# Patient Record
Sex: MALE | Race: WHITE | NOT HISPANIC OR LATINO | Employment: FULL TIME | URBAN - METROPOLITAN AREA
[De-identification: names, ages, dates, MRNs, and addresses within clinical notes are randomized per-mention and may not be internally consistent; named-entity substitution may affect disease eponyms.]

---

## 2019-01-11 ENCOUNTER — ANCILLARY PROCEDURE (OUTPATIENT)
Dept: GENERAL RADIOLOGY | Facility: CLINIC | Age: 49
End: 2019-01-11
Payer: COMMERCIAL

## 2019-01-11 ENCOUNTER — OFFICE VISIT (OUTPATIENT)
Dept: ORTHOPEDICS | Facility: CLINIC | Age: 49
End: 2019-01-11
Payer: COMMERCIAL

## 2019-01-11 VITALS
BODY MASS INDEX: 24.48 KG/M2 | DIASTOLIC BLOOD PRESSURE: 87 MMHG | WEIGHT: 201 LBS | HEIGHT: 76 IN | SYSTOLIC BLOOD PRESSURE: 132 MMHG

## 2019-01-11 DIAGNOSIS — M25.532 LEFT WRIST PAIN: Primary | ICD-10-CM

## 2019-01-11 DIAGNOSIS — M25.532 LEFT WRIST PAIN: ICD-10-CM

## 2019-01-11 ASSESSMENT — MIFFLIN-ST. JEOR: SCORE: 1883.23

## 2019-01-11 NOTE — PROGRESS NOTES
SPORTS & ORTHOPEDIC WALK-IN VISIT 1/11/2019    Primary Care Physician:      Left wrist pain. He fell (FOOSH) skating on [place] day. He had some swelling and pain in his wrist initial. Pain with supination and pronation and bearing weight.     Reason for visit:     What part of your body is injured / painful?  left wrist    What caused the injury /pain? Fall    How long ago did your injury occur or pain begin? 12/25    What are your most bothersome symptoms? Pain    How would you characterize your symptom?  aching    What makes your symptoms better? Rest and Ice    What makes your symptoms worse? Movement and Other: weight bearing     Have you been previously seen for this problem? No    Medical History:    Any recent changes to your medical history? No    Any new medication prescribed since last visit? No    Have you had surgery on this body part before? No    Social History:    Occupation:     Handedness: Right    Exercise: 3-4 days/week    Review of Systems:    Do you have fever, chills, weight loss? No    Do you have any vision problems? No    Do you have any chest pain or edema? No    Do you have any shortness of breath or wheezing?  No    Do you have stomach problems? No    Do you have any numbness or focal weakness? No    Do you have diabetes? No    Do you have problems with bleeding or clotting? No    Do you have an rashes or other skin lesions? No

## 2019-01-11 NOTE — PATIENT INSTRUCTIONS
Extensor Carpi Ulnaris Tendonitis. (ECU)    1. Rest from offending activities  2. Wrist brace without thumb spica  3. Aleve twice daily x 5-7 days  4. Ice to wrist  5. If persisting in 4-6 weeks, return to walk-in clinic    Wrist Stretching/Strengthening Exercises    STRETCHING EXERCISES    Wrist range of motion  Flexion: Gently bend your wrist forward. Hold for 5 seconds. Do 2 sets of 15.  Extension: Gently bend your wrist backward. Hold this position 5 seconds. Do 2 sets of 15.    Side to side: Gently move your wrist from side to side (a handshake motion). Hold for 5 seconds in each direction. Do 2 sets of 15.  Wrist stretch: Press the back of the hand on your injured side with your other hand to help bend your wrist. Hold for 15 to 30 seconds. Next, stretch the hand back by pressing the fingers in a backward direction. Hold for 15 to 30 seconds. Keep the arm on your injured side straight during this exercise. Do 3 sets.    Wrist extension stretch: Stand at a table with your palms down, fingers flat, and elbows straight. Lean your body weight forward. Hold this position for 15 seconds. Repeat 3 times.    Wrist flexion stretch: Stand with the back of your hands on a table, palms facing up, fingers pointing toward your body, and elbows straight. Lean away from the table. Hold this position for 15 to 30 seconds. Repeat 3 times.    Forearm pronation and supination: Bend the elbow of your injured arm 90 degrees, keeping your elbow at your side. Turn your palm up and hold for 5 seconds. Then slowly turn your palm down and hold for 5 seconds. Make sure you keep your elbow at your side and bent 90 degrees while you do the exercise. Do 2 sets of 15.    When this exercise becomes pain free, do it with some weight in your hand such as a soup can or hammer handle.    STRENGTHENING EXERCISES    Wrist flexion: Hold a can or hammer handle in your hand with your palm facing up. Bend your wrist upward. Slowly lower the weight and  return to the starting position. Do 2 sets of 15. Gradually increase the weight of the can or weight you are holding.    Wrist extension: Hold a soup can or hammer handle in your hand with your palm facing down. Slowly bend your wrist up. Slowly lower the weight down into the starting position. Do 2 sets of 15. Gradually increase the weight of the object you are holding.     strengthening: Squeeze a soft rubber ball and hold the squeeze for 5 seconds. Do 2 sets of 15.    Developed by [employer].  Published by [employer].  Copyright  2014 [employer] and/or one of its subsidiaries. All rights reserved.

## 2019-01-11 NOTE — LETTER
1/11/2019       RE: Gregory Freitas  [address]  [place] [address]     Dear Colleague,    Thank you for referring your patient, Gregory Freitas, to the [facility] SPORTS AND ORTHOPAEDIC WALK IN CLINIC at [facility]. Please see a copy of my visit note below.          SPORTS & ORTHOPEDIC WALK-IN VISIT 1/11/2019    Primary Care Physician:      Left wrist pain. He fell (FOOSH) skating on angel[place] day. He had some swelling and pain in his wrist initial. Pain with supination and pronation and bearing weight.     Reason for visit:     What part of your body is injured / painful?  left wrist    What caused the injury /pain? Fall    How long ago did your injury occur or pain begin? 12/25    What are your most bothersome symptoms? Pain    How would you characterize your symptom?  aching    What makes your symptoms better? Rest and Ice    What makes your symptoms worse? Movement and Other: weight bearing     Have you been previously seen for this problem? No    Medical History:    Any recent changes to your medical history? No    Any new medication prescribed since last visit? No    Have you had surgery on this body part before? No    Social History:    Occupation:     Handedness: Right    Exercise: 3-4 days/week       CHIEF COMPLAINT:  Pain of the Left Wrist       HISTORY OF PRESENT ILLNESS  Mr. Freitas is a pleasant 48 year old year old male who presents to clinic today with pain of left wrist.  Gregory explains that he had a fall on an outstretched hand on [place] Day while ice skating.  Dorsal wrist swelling after this.  Has not worn brace and has improved overall, just having residual pain with loading wrist in extension.     Onset: sudden  Location: left wrist  Quality:  aching  Duration: 2.5 weeks   Severity: Mild  Timing:intermittent episodes   Modifying factors:  resting and non-use makes it better, movement and use makes it worse  Associated  "signs & symptoms: swelling, since resolved  Previous similar pain: No  Treatments to date: Ice, rest    Additional history: as documented    MEDICAL HISTORY  There is no problem list on file for this patient.      Current Outpatient Medications   Medication Sig Dispense Refill     azithromycin (ZITHROMAX) 250 MG tablet Two tablets first day, then one tablet daily for four days. (Patient not taking: Reported on 1/11/2019) 6 tablet 0     Naproxen Sodium (ALEVE PO)        Pseudoeph-Doxylamine-DM-APAP (NYQUIL PO)          No Known Allergies    Family History   Problem Relation Age of Onset     Hypertension Father        Additional medical/Social/Surgical histories reviewed in [facility] and updated as appropriate.     PHYSICAL EXAM  /87   Ht 1.93 m (6' 4\")   Wt 91.2 kg (201 lb)   BMI 24.47 kg/m       General  - normal appearance, in no obvious distress  HEENT  -Pupils equal, round, no conjunctival injection.  No lid lag  CV  - normal radial pulse  Pulm  - normal respiratory pattern, non-labored  Musculoskeletal - Left wrist  - inspection: normal joint alignment, no obvious deformity, no swelling or ecchymosis  - palpation: no bony tenderness. TTP of distal aspect of ECU tendon from ulnar styloid to insertion at base of fifth MT.  - ROM:  90 deg flexion   60 deg extension   25 deg abduction   65 deg adduction  - strength: 5/5  strength, 5/5 flexion, extension, pronation, supination, adduction, abduction  - special tests:  (-) Finkelstein  (-) Phalen  (-) Cr click test  (-) ulnar impaction  (-) foveal tenderness  Neuro  - no sensory or motor deficit, grossly normal coordination, normal muscle tone  Skin  - no ecchymosis, erythema, warmth, or induration, no obvious rash  Psych  - interactive, appropriate, normal mood and affect    IMAGING : XR left wrist Final results and radiologist's interpretation, available in the [facility] health record. Images were reviewed with the patient/family members in the office today. " My personal interpretation of the performed imaging is no acute osseous abnormalities of left wrist.     ASSESSMENT & PLAN  Mr. Freitas is a 48 year old year old male who presents to clinic today with acute left wrist pain residual from injury 2.5 weeks ago.  He continues to feel ulnar sided wrist pain with loading wrist in extension.  Suspected ECU tendonitis.     Diagnosis: Pain of left wrist, acute    -Wrist brace recommended volar  -Ice, rest from offending activity  -Ibuprofen/aleve regimen discussed  -HEP wrist exercises  -Follow up PRN if persisting 4 weeks    It was a pleasure seeing Gregory today.    Addendum: After reviewing [facility] radiology read, it appears there is an age indeterminate fragment near triquetrum on lateral XR.  This raises my suspicion for a small triquetral avulsion fracture.  Given mechanism and report of previous swelling dorsal wrist, will treat as such.  Called to discuss change in plan, left voice mail.  Recommended he return for exos splint or strict adherence to a volar wrist brace given he is already 2.5 weeks out from injury.  It would be advisable to wear for about 2 weeks, avoid HEP until after a follow up visit with me.  A [employer] message also left giving instruction to return call/message.     Kervin Pierson DO, CAQSM  Primary Care Sports Medicine

## 2019-01-15 NOTE — PROGRESS NOTES
CHIEF COMPLAINT:  Pain of the Left Wrist       HISTORY OF PRESENT ILLNESS  Mr. Freitas is a pleasant 48 year old year old male who presents to clinic today with pain of left wrist.  Gregory explains that he had a fall on an outstretched hand on [place] Day while ice skating.  Dorsal wrist swelling after this.  Has not worn brace and has improved overall, just having residual pain with loading wrist in extension.     Onset: sudden  Location: left wrist  Quality:  aching  Duration: 2.5 weeks   Severity: Mild  Timing:intermittent episodes   Modifying factors:  resting and non-use makes it better, movement and use makes it worse  Associated signs & symptoms: swelling, since resolved  Previous similar pain: No  Treatments to date: Ice, rest    Additional history: as documented    MEDICAL HISTORY  There is no problem list on file for this patient.      Current Outpatient Medications   Medication Sig Dispense Refill     azithromycin (ZITHROMAX) 250 MG tablet Two tablets first day, then one tablet daily for four days. (Patient not taking: Reported on 1/11/2019) 6 tablet 0     Naproxen Sodium (ALEVE PO)        Pseudoeph-Doxylamine-DM-APAP (NYQUIL PO)          No Known Allergies    Family History   Problem Relation Age of Onset     Hypertension Father        Additional medical/Social/Surgical histories reviewed in [facility] and updated as appropriate.     REVIEW OF SYSTEMS (1/15/2019)  CONSTITUTIONAL: Denies fever and weight loss  EYES: Denies acute vision changes  ENT: Denies hearing changes or difficulty swallowing  CARDIAC: Denies chest pain or edema  RESPIRATORY: Denies dyspnea, cough or wheeze  GASTROINTESTINAL: Denies abdominal pain, nausea, vomiting  MUSCULOSKELETAL: See HPI  SKIN: Denies any recent rash or lesion  NEUROLOGICAL: Denies numbness or focal weakness  PSYCHIATRIC: No history of psychiatric symptoms or problems  ENDOCRINE: Diagnosis of diabetes:No  HEMATOLOGY: Denies episodes of easy bleeding      PHYSICAL  "EXAM  /87   Ht 1.93 m (6' 4\")   Wt 91.2 kg (201 lb)   BMI 24.47 kg/m      General  - normal appearance, in no obvious distress  HEENT  -Pupils equal, round, no conjunctival injection.  No lid lag  CV  - normal radial pulse  Pulm  - normal respiratory pattern, non-labored  Musculoskeletal - Left wrist  - inspection: normal joint alignment, no obvious deformity, no swelling or ecchymosis  - palpation: no bony tenderness. TTP of distal aspect of ECU tendon from ulnar styloid to insertion at base of fifth MT.  - ROM:  90 deg flexion   60 deg extension   25 deg abduction   65 deg adduction  - strength: 5/5  strength, 5/5 flexion, extension, pronation, supination, adduction, abduction  - special tests:  (-) Finkelstein  (-) Phalen  (-) Cr click test  (-) ulnar impaction  (-) foveal tenderness  Neuro  - no sensory or motor deficit, grossly normal coordination, normal muscle tone  Skin  - no ecchymosis, erythema, warmth, or induration, no obvious rash  Psych  - interactive, appropriate, normal mood and affect    IMAGING : XR left wrist Final results and radiologist's interpretation, available in the [facility] health record. Images were reviewed with the patient/family members in the office today. My personal interpretation of the performed imaging is no acute osseous abnormalities of left wrist.     ASSESSMENT & PLAN  Mr. Freitas is a 48 year old year old male who presents to clinic today with acute left wrist pain residual from injury 2.5 weeks ago.  He continues to feel ulnar sided wrist pain with loading wrist in extension.  Suspected ECU tendonitis.     Diagnosis: Pain of left wrist, acute    -Wrist brace recommended volar  -Ice, rest from offending activity  -Ibuprofen/aleve regimen discussed  -HEP wrist exercises  -Follow up PRN if persisting 4 weeks    It was a pleasure seeing Gregory today.    Addendum: After reviewing [facility] radiology read, it appears there is an age indeterminate fragment near triquetrum " on lateral XR.  This raises my suspicion for a small triquetral avulsion fracture.  Given mechanism and report of previous swelling dorsal wrist, will treat as such.  Called to discuss change in plan, left voice mail.  Recommended he return for exos splint or strict adherence to a volar wrist brace given he is already 2.5 weeks out from injury.  It would be advisable to wear for about 2 weeks, avoid HEP until after a follow up visit with me.  A mychart message also left giving instruction to return call/message.     Kervin Pierson DO, CAQSM  Primary Care Sports Medicine

## 2019-10-04 ENCOUNTER — HEALTH MAINTENANCE LETTER (OUTPATIENT)
Age: 49
End: 2019-10-04

## 2020-11-08 ENCOUNTER — HEALTH MAINTENANCE LETTER (OUTPATIENT)
Age: 50
End: 2020-11-08

## 2021-09-11 ENCOUNTER — HEALTH MAINTENANCE LETTER (OUTPATIENT)
Age: 51
End: 2021-09-11

## 2022-01-01 ENCOUNTER — HEALTH MAINTENANCE LETTER (OUTPATIENT)
Age: 52
End: 2022-01-01

## 2022-10-29 ENCOUNTER — HEALTH MAINTENANCE LETTER (OUTPATIENT)
Age: 52
End: 2022-10-29

## 2023-04-08 ENCOUNTER — HEALTH MAINTENANCE LETTER (OUTPATIENT)
Age: 53
End: 2023-04-08

## 2023-12-13 ASSESSMENT — ENCOUNTER SYMPTOMS
CHILLS: 0
HEADACHES: 0
EYE PAIN: 0
HEMATOCHEZIA: 0
CONSTIPATION: 0
DYSURIA: 0
ABDOMINAL PAIN: 0
FEVER: 0
SHORTNESS OF BREATH: 0
NAUSEA: 0
PARESTHESIAS: 0
WEAKNESS: 0
ARTHRALGIAS: 0
HEARTBURN: 0
JOINT SWELLING: 0
MYALGIAS: 0
NERVOUS/ANXIOUS: 0
PALPITATIONS: 0
SORE THROAT: 0
COUGH: 0
DIARRHEA: 0
FREQUENCY: 0
DIZZINESS: 0
HEMATURIA: 0

## 2023-12-14 ENCOUNTER — LAB (OUTPATIENT)
Dept: FAMILY MEDICINE | Facility: CLINIC | Age: 53
End: 2023-12-14

## 2023-12-14 ENCOUNTER — OFFICE VISIT (OUTPATIENT)
Dept: FAMILY MEDICINE | Facility: CLINIC | Age: 53
End: 2023-12-14
Payer: COMMERCIAL

## 2023-12-14 VITALS
RESPIRATION RATE: 17 BRPM | TEMPERATURE: 97.5 F | HEART RATE: 61 BPM | BODY MASS INDEX: 25.12 KG/M2 | OXYGEN SATURATION: 99 % | DIASTOLIC BLOOD PRESSURE: 98 MMHG | HEIGHT: 75 IN | WEIGHT: 202 LBS | SYSTOLIC BLOOD PRESSURE: 163 MMHG

## 2023-12-14 DIAGNOSIS — Z12.11 SCREEN FOR COLON CANCER: ICD-10-CM

## 2023-12-14 DIAGNOSIS — Z23 NEED FOR VACCINATION: ICD-10-CM

## 2023-12-14 DIAGNOSIS — Z13.1 SCREENING FOR DIABETES MELLITUS: ICD-10-CM

## 2023-12-14 DIAGNOSIS — I10 ESSENTIAL HYPERTENSION: Primary | ICD-10-CM

## 2023-12-14 LAB
ALBUMIN UR-MCNC: NEGATIVE MG/DL
APPEARANCE UR: CLEAR
BILIRUB UR QL STRIP: NEGATIVE
COLOR UR AUTO: YELLOW
GLUCOSE UR STRIP-MCNC: NEGATIVE MG/DL
HBA1C MFR BLD: 5.5 % (ref 0–5.6)
HGB UR QL STRIP: NEGATIVE
KETONES UR STRIP-MCNC: NEGATIVE MG/DL
LEUKOCYTE ESTERASE UR QL STRIP: NEGATIVE
NITRATE UR QL: NEGATIVE
PH UR STRIP: 5.5 [PH] (ref 5–7)
SP GR UR STRIP: 1.02 (ref 1–1.03)
UROBILINOGEN UR STRIP-ACNC: 0.2 E.U./DL

## 2023-12-14 PROCEDURE — 83036 HEMOGLOBIN GLYCOSYLATED A1C: CPT | Performed by: FAMILY MEDICINE

## 2023-12-14 PROCEDURE — 90686 IIV4 VACC NO PRSV 0.5 ML IM: CPT | Performed by: FAMILY MEDICINE

## 2023-12-14 PROCEDURE — 36415 COLL VENOUS BLD VENIPUNCTURE: CPT | Performed by: FAMILY MEDICINE

## 2023-12-14 PROCEDURE — 82043 UR ALBUMIN QUANTITATIVE: CPT | Performed by: FAMILY MEDICINE

## 2023-12-14 PROCEDURE — 82570 ASSAY OF URINE CREATININE: CPT | Performed by: FAMILY MEDICINE

## 2023-12-14 PROCEDURE — 81003 URINALYSIS AUTO W/O SCOPE: CPT | Performed by: FAMILY MEDICINE

## 2023-12-14 PROCEDURE — 90480 ADMN SARSCOV2 VAC 1/ONLY CMP: CPT | Performed by: FAMILY MEDICINE

## 2023-12-14 PROCEDURE — 90471 IMMUNIZATION ADMIN: CPT | Performed by: FAMILY MEDICINE

## 2023-12-14 PROCEDURE — 99386 PREV VISIT NEW AGE 40-64: CPT | Mod: 25 | Performed by: FAMILY MEDICINE

## 2023-12-14 PROCEDURE — 80053 COMPREHEN METABOLIC PANEL: CPT | Performed by: FAMILY MEDICINE

## 2023-12-14 PROCEDURE — 80061 LIPID PANEL: CPT | Performed by: FAMILY MEDICINE

## 2023-12-14 PROCEDURE — 91320 SARSCV2 VAC 30MCG TRS-SUC IM: CPT | Performed by: FAMILY MEDICINE

## 2023-12-14 PROCEDURE — 99214 OFFICE O/P EST MOD 30 MIN: CPT | Mod: 25 | Performed by: FAMILY MEDICINE

## 2023-12-14 RX ORDER — AMLODIPINE BESYLATE 5 MG/1
5-10 TABLET ORAL DAILY
Qty: 90 TABLET | Refills: 3 | Status: SHIPPED | OUTPATIENT
Start: 2023-12-14 | End: 2024-07-24

## 2023-12-14 ASSESSMENT — ENCOUNTER SYMPTOMS
ABDOMINAL PAIN: 0
CHILLS: 0
FREQUENCY: 0
SHORTNESS OF BREATH: 0
FEVER: 0
CONSTIPATION: 0
JOINT SWELLING: 0
HEMATOCHEZIA: 0
SORE THROAT: 0
NERVOUS/ANXIOUS: 0
PALPITATIONS: 0
WEAKNESS: 0
ARTHRALGIAS: 0
HEARTBURN: 0
HEADACHES: 0
DIZZINESS: 0
NAUSEA: 0
DYSURIA: 0
DIARRHEA: 0
COUGH: 0
MYALGIAS: 0
HEMATURIA: 0
PARESTHESIAS: 0
EYE PAIN: 0

## 2023-12-14 ASSESSMENT — PAIN SCALES - GENERAL: PAINLEVEL: NO PAIN (0)

## 2023-12-14 NOTE — PROGRESS NOTES
"SUBJECTIVE:   Gregory is a 53 year old, presenting for the following:  Blood pressure       12/14/2023     2:47 PM   Additional Questions   Roomed by Guedra Thomas       Healthy Habits:     Getting at least 3 servings of Calcium per day:  Yes    Bi-annual eye exam:  Yes    Dental care twice a year:  Yes    Sleep apnea or symptoms of sleep apnea:  None    Diet:  Regular (no restrictions)    Frequency of exercise:  4-5 days/week    Duration of exercise:  30-45 minutes    Taking medications regularly:  Yes    Medication side effects:  Not applicable    Additional concerns today:  Yes    Bp has been elevated.    Diet can spike it  Certain stress or activities make it worse  Has a cuff at home   160s are fairly common systolic   Family history  of  it          Today's PHQ-2 Score:       12/13/2023     2:40 PM   PHQ-2 ( 1999 [employer])   Q1: Little interest or pleasure in doing things 0   Q2: Feeling down, depressed or hopeless 0   PHQ-2 Score 0   Q1: Little interest or pleasure in doing things Not at all   Q2: Feeling down, depressed or hopeless Not at all   PHQ-2 Score 0     Have you ever done Advance Care Planning? (For example, a Health Directive, POLST, or a discussion with a medical provider or your loved ones about your wishes): No, advance care planning information given to patient to review.  Patient plans to discuss their wishes with loved ones or provider.      Social History     Tobacco Use    Smoking status: Never    Smokeless tobacco: Never   Substance Use Topics    Alcohol use: Yes     Comment: 4-5 drinks per week             12/13/2023     2:40 PM   Alcohol Use   Prescreen: >3 drinks/day or >7 drinks/week? No          No data to display                Last PSA: No results found for: \"PSA\"    Reviewed orders with patient. Reviewed health maintenance and updated orders accordingly - Yes    Reviewed and updated as needed this visit by clinical staff   Tobacco  Allergies  Meds              Review of Systems " "  Constitutional:  Negative for chills and fever.   HENT:  Positive for hearing loss. Negative for congestion, ear pain and sore throat.    Eyes:  Negative for pain and visual disturbance.   Respiratory:  Negative for cough and shortness of breath.    Cardiovascular:  Negative for chest pain, palpitations and peripheral edema.   Gastrointestinal:  Negative for abdominal pain, constipation, diarrhea, heartburn, hematochezia and nausea.   Genitourinary:  Negative for dysuria, frequency, genital sores, hematuria, impotence, penile discharge and urgency.   Musculoskeletal:  Negative for arthralgias, joint swelling and myalgias.   Skin:  Negative for rash.   Neurological:  Negative for dizziness, weakness, headaches and paresthesias.   Psychiatric/Behavioral:  Negative for mood changes. The patient is not nervous/anxious.      OBJECTIVE:   BP (!) 163/98 (BP Location: Right arm, Patient Position: Sitting, Cuff Size: Adult Regular)   Pulse 61   Temp 97.5  F (36.4  C) (Temporal)   Resp 17   Ht 1.909 m (6' 3.16\")   Wt 91.6 kg (202 lb)   SpO2 99%   BMI 25.14 kg/m      Physical Exam    ASSESSMENT/PLAN:       ICD-10-CM    1. Essential hypertension  I10 REVIEW OF HEALTH MAINTENANCE PROTOCOL ORDERS     Lipid panel reflex to direct LDL Fasting     Comprehensive metabolic panel (BMP + Alb, Alk Phos, ALT, AST, Total. Bili, TP)     UA Macroscopic with reflex to Microscopic and Culture - Lab Collect     Albumin Random Urine Quantitative with Creat Ratio     amLODIPine (NORVASC) 5 MG tablet     Lipid panel reflex to direct LDL Fasting     Comprehensive metabolic panel (BMP + Alb, Alk Phos, ALT, AST, Total. Bili, TP)     UA Macroscopic with reflex to Microscopic and Culture - Lab Collect     Albumin Random Urine Quantitative with Creat Ratio   Will start amlodipine  Self titrate from 5-10 mg if needed for goal of <120/80 without side effects   Contact with any concerns.  If still above that at 10mg daily, make appointment for 1 " "month    2. Screen for colon cancer  Z12.11 COLOGUARD([employer])      3. Screening for diabetes mellitus  Z13.1 Hemoglobin A1c     Hemoglobin A1c      4. Need for vaccination  Z23 COVID-19 12+ (2023-24) (PFIZER)     INFLUENZA VACCINE >6 MONTHS (AFLURIA/FLUZONE)          BMI:   Estimated body mass index is 25.14 kg/m  as calculated from the following:    Height as of this encounter: 1.909 m (6' 3.16\").    Weight as of this encounter: 91.6 kg (202 lb).   Weight management plan: Discussed healthy diet and exercise guidelines      He reports that he has never smoked. He has never used smokeless tobacco.    Yves Billy DO  [facility]  "

## 2023-12-15 LAB
ALBUMIN SERPL BCG-MCNC: 4.6 G/DL (ref 3.5–5.2)
ALP SERPL-CCNC: 57 U/L (ref 40–150)
ALT SERPL W P-5'-P-CCNC: 21 U/L (ref 0–70)
ANION GAP SERPL CALCULATED.3IONS-SCNC: 10 MMOL/L (ref 7–15)
AST SERPL W P-5'-P-CCNC: 24 U/L (ref 0–45)
BILIRUB SERPL-MCNC: 0.2 MG/DL
BUN SERPL-MCNC: 15.8 MG/DL (ref 6–20)
CALCIUM SERPL-MCNC: 9.7 MG/DL (ref 8.6–10)
CHLORIDE SERPL-SCNC: 104 MMOL/L (ref 98–107)
CHOLEST SERPL-MCNC: 226 MG/DL
CREAT SERPL-MCNC: 0.95 MG/DL (ref 0.67–1.17)
CREAT UR-MCNC: 142 MG/DL
DEPRECATED HCO3 PLAS-SCNC: 28 MMOL/L (ref 22–29)
EGFRCR SERPLBLD CKD-EPI 2021: >90 ML/MIN/1.73M2
FASTING STATUS PATIENT QL REPORTED: NO
GLUCOSE SERPL-MCNC: 88 MG/DL (ref 70–99)
HDLC SERPL-MCNC: 59 MG/DL
LDLC SERPL CALC-MCNC: 137 MG/DL
MICROALBUMIN UR-MCNC: <12 MG/L
MICROALBUMIN/CREAT UR: NORMAL MG/G{CREAT}
NONHDLC SERPL-MCNC: 167 MG/DL
POTASSIUM SERPL-SCNC: 4.3 MMOL/L (ref 3.4–5.3)
PROT SERPL-MCNC: 6.9 G/DL (ref 6.4–8.3)
SODIUM SERPL-SCNC: 142 MMOL/L (ref 135–145)
TRIGL SERPL-MCNC: 149 MG/DL

## 2023-12-22 ENCOUNTER — TELEPHONE (OUTPATIENT)
Dept: URGENT CARE | Facility: URGENT CARE | Age: 53
End: 2023-12-22
Payer: COMMERCIAL

## 2023-12-22 DIAGNOSIS — E78.9 LIPID DISORDER: Primary | ICD-10-CM

## 2023-12-22 RX ORDER — ATORVASTATIN CALCIUM 10 MG/1
10 TABLET, FILM COATED ORAL DAILY
Qty: 90 TABLET | Refills: 1 | Status: SHIPPED | OUTPATIENT
Start: 2023-12-22 | End: 2024-01-26 | Stop reason: SINTOL

## 2023-12-22 NOTE — TELEPHONE ENCOUNTER
The 10-year ASCVD risk score (Hillary SCHWARTZ, et al., 2019) is: 8.2%    Values used to calculate the score:      Age: 53 years      Sex: Male      Is Non- : No      Diabetic: No      Tobacco smoker: No      Systolic Blood Pressure: 163 mmHg      Is BP treated: Yes      HDL Cholesterol: 59 mg/dL      Total Cholesterol: 226 mg/dL

## 2023-12-22 NOTE — TELEPHONE ENCOUNTER
FYI - Status Update    Who is Calling: patient    Update: pt stated that he wants to go ahead and start on the cholesterol medication you both talked about during his wellness visit on 12/14/2023    Does caller want a call/response back: Yes     Could we send this information to you in [facility] or would you prefer to receive a phone call?:   Patient would prefer a phone call   Okay to leave a detailed message?: Yes at Cell number on file:    Telephone Information:   Mobile [phone]

## 2024-01-26 ENCOUNTER — MYC MEDICAL ADVICE (OUTPATIENT)
Dept: FAMILY MEDICINE | Facility: CLINIC | Age: 54
End: 2024-01-26
Payer: COMMERCIAL

## 2024-01-27 LAB — NONINV COLON CA DNA+OCC BLD SCRN STL QL: NEGATIVE

## 2024-06-09 ENCOUNTER — HEALTH MAINTENANCE LETTER (OUTPATIENT)
Age: 54
End: 2024-06-09

## 2024-06-10 ENCOUNTER — MYC REFILL (OUTPATIENT)
Dept: FAMILY MEDICINE | Facility: CLINIC | Age: 54
End: 2024-06-10
Payer: COMMERCIAL

## 2024-06-10 DIAGNOSIS — I10 ESSENTIAL HYPERTENSION: ICD-10-CM

## 2024-06-10 RX ORDER — AMLODIPINE BESYLATE 5 MG/1
5-10 TABLET ORAL DAILY
Qty: 90 TABLET | Refills: 3 | OUTPATIENT
Start: 2024-06-10

## 2024-07-24 ENCOUNTER — MYC REFILL (OUTPATIENT)
Dept: FAMILY MEDICINE | Facility: CLINIC | Age: 54
End: 2024-07-24
Payer: COMMERCIAL

## 2024-07-24 DIAGNOSIS — I10 ESSENTIAL HYPERTENSION: ICD-10-CM

## 2024-08-01 RX ORDER — AMLODIPINE BESYLATE 5 MG/1
5-10 TABLET ORAL DAILY
Qty: 90 TABLET | Refills: 3 | Status: SHIPPED | OUTPATIENT
Start: 2024-08-01

## 2025-01-06 ENCOUNTER — MYC REFILL (OUTPATIENT)
Dept: FAMILY MEDICINE | Facility: CLINIC | Age: 55
End: 2025-01-06
Payer: COMMERCIAL

## 2025-01-06 DIAGNOSIS — I10 ESSENTIAL HYPERTENSION: ICD-10-CM

## 2025-01-07 RX ORDER — AMLODIPINE BESYLATE 10 MG/1
10 TABLET ORAL DAILY
Qty: 90 TABLET | Refills: 3 | Status: SHIPPED | OUTPATIENT
Start: 2025-01-07

## 2025-01-07 NOTE — TELEPHONE ENCOUNTER
Please call patient due to a dose change.  Relay the following information.     Yves Billy, DO     Bobby Jenkins,     The orders are written for 180 tablets but they are only giving you 90 tablets.  I am not sure why the pharmacy and insurance are doing that and not giving you the total that I ordered.       I will change the prescription to 10 mg tablets #90 so we shouldn't run into this issue. You will only take 1 tablet a day rather than the 2 fives as you were before.       Yves Billy, DO

## 2025-06-15 ENCOUNTER — HEALTH MAINTENANCE LETTER (OUTPATIENT)
Age: 55
End: 2025-06-15